# Patient Record
Sex: MALE | Race: WHITE | NOT HISPANIC OR LATINO | Employment: STUDENT | URBAN - METROPOLITAN AREA
[De-identification: names, ages, dates, MRNs, and addresses within clinical notes are randomized per-mention and may not be internally consistent; named-entity substitution may affect disease eponyms.]

---
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
## 2023-11-23 NOTE — PROGRESS NOTES
[address] [facility]        NAME: Pan Tan is a 14 y o  male  : 2005    MRN: [id number]  DATE: 2021  TIME: 7:30 PM    Assessment and Plan   Nausea and vomiting, intractability of vomiting not specified, unspecified vomiting type [R11 2]  1  Nausea and vomiting, intractability of vomiting not specified, unspecified vomiting type  Transfer to other facility   2  Diarrhea, unspecified type  Transfer to other facility   3  Periumbilical abdominal pain  Transfer to other facility   4  Fever, unspecified fever cause  Transfer to other facility         Patient Instructions     Patient Instructions   Patient is experiencing nausea/vomiting and diarrhea associated with abdominal pain x 4 days  He feels the symptoms are worsening  He has a fever of 101 4 on exam at this time and periumbilical abdominal tenderness  Patient has been referred to the ER for further evaluation and treatment  Patient and parent verbalize understanding and agree w/ the plan, they have chosen to go to [address]  He will be driven by his mother  Chief Complaint     Chief Complaint   Patient presents with    Vomiting    Diarrhea         History of Present Illness       13 yo male presents c/o nausea/vomiting and diarrhea x 4 days  He has associated abdominal pain in the periumbilical region that it intermittent  Pain does not radiate  He has a decreased appetite and states he is not tolerating oral intake since yesterday  No blood in the vomit or stool  He describes the stool as loose and watery  He states he is having > 5 episodes of vomiting and diarrhea per day  He states his symptoms seem worse today  He has a fever of 101 4 at this time  He has not felt feverish prior to today  No headache or body aches  No chest pain or SOB  No cold/URI symptoms  No skin rashes  No flank pain or  symptoms  No recent travel or known exposure to anyone with COVID-19   He has not had the COVID-19 vaccine  No known sick contacts  He works at [employer] in [address]  No one else at home with similar symptoms  He denies any new foods or medications  No known allergies  No treatment attempted for the symptoms  Review of Systems   Review of Systems   Constitutional: Positive for chills and fever  Fever of 101 4 currently   HENT: Negative  Eyes: Negative  Respiratory: Negative  Cardiovascular: Negative  Gastrointestinal: Positive for abdominal pain, diarrhea, nausea and vomiting  As noted in HPI   Genitourinary: Negative  Musculoskeletal: Negative  Skin: Negative  Allergic/Immunologic: Negative  Neurological: Negative  Hematological: Negative  Current Medications     No current outpatient medications on file  Current Allergies     Allergies as of 09/08/2021    (No Known Allergies)            The following portions of the patient's history were reviewed and updated as appropriate: allergies, current medications, past family history, past medical history, past social history, past surgical history and problem list      Past Medical History:   Diagnosis Date    Bilateral otitis media 4/3/2018       History reviewed  No pertinent surgical history  Family History   Problem Relation Age of Onset    No Known Problems Mother     Hypertension Father     Substance Abuse Neg Hx     Mental illness Neg Hx          Medications have been verified  Objective   /70 (BP Location: Right arm, Patient Position: Sitting, Cuff Size: Large)   Pulse (!) 112   Temp (!) 101 4 °F (38 6 °C) (Tympanic)   Resp 18   Ht 5' 11" (1 803 m)   Wt 123 kg (271 lb)   SpO2 98%   BMI 37 80 kg/m²   No LMP for male patient  Physical Exam     Physical Exam  Vitals and nursing note reviewed  Exam conducted with a chaperone present (mother)  Constitutional:       General: He is awake  He is not in acute distress  Appearance: Normal appearance   He is well-developed and well-groomed  He is obese  He is ill-appearing  He is not toxic-appearing or diaphoretic  HENT:      Head: Normocephalic and atraumatic  Jaw: There is normal jaw occlusion  Right Ear: Tympanic membrane, ear canal and external ear normal       Left Ear: Tympanic membrane, ear canal and external ear normal       Nose: Nose normal       Mouth/Throat:      Lips: Pink  Mouth: Mucous membranes are dry  Pharynx: Oropharynx is clear  Uvula midline  Eyes:      General: Lids are normal  Vision grossly intact  Gaze aligned appropriately  Conjunctiva/sclera: Conjunctivae normal    Cardiovascular:      Rate and Rhythm: Regular rhythm  Tachycardia present  Pulses: Normal pulses  Heart sounds: Normal heart sounds  Pulmonary:      Effort: Pulmonary effort is normal  No tachypnea, accessory muscle usage or respiratory distress  Breath sounds: Normal breath sounds and air entry  Abdominal:      General: Abdomen is flat  There is no distension  There are no signs of injury  Palpations: Abdomen is soft  There is no mass  Tenderness: There is abdominal tenderness in the periumbilical area  There is no right CVA tenderness, left CVA tenderness, guarding or rebound  Negative signs include Parikh's sign, Rovsing's sign and McBurney's sign  Hernia: No hernia is present  Musculoskeletal:      Cervical back: Full passive range of motion without pain, normal range of motion and neck supple  No rigidity or tenderness  Lymphadenopathy:      Cervical: No cervical adenopathy  Skin:     General: Skin is warm and dry  Capillary Refill: Capillary refill takes less than 2 seconds  Coloration: Skin is not pale  Findings: No erythema or rash  Neurological:      General: No focal deficit present  Mental Status: He is alert and oriented to person, place, and time  Mental status is at baseline     Psychiatric:         Attention and Perception: Attention and perception normal          Mood and Affect: Mood and affect normal          Speech: Speech normal          Behavior: Behavior normal  Behavior is cooperative  Thought Content:  Thought content normal          Cognition and Memory: Cognition and memory normal          Judgment: Judgment normal  No